# Patient Record
Sex: MALE | Race: WHITE
[De-identification: names, ages, dates, MRNs, and addresses within clinical notes are randomized per-mention and may not be internally consistent; named-entity substitution may affect disease eponyms.]

---

## 2018-09-16 ENCOUNTER — HOSPITAL ENCOUNTER (EMERGENCY)
Dept: HOSPITAL 80 - FED | Age: 18
LOS: 1 days | Discharge: HOME | End: 2018-09-17
Payer: COMMERCIAL
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
## 2018-09-16 DIAGNOSIS — S02.2XXA: ICD-10-CM

## 2018-09-16 DIAGNOSIS — S46.911A: ICD-10-CM

## 2018-09-16 DIAGNOSIS — S01.512A: Primary | ICD-10-CM
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
## 2018-09-16 DIAGNOSIS — S00.83XA: ICD-10-CM
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
## 2018-09-16 PROCEDURE — A4565 SLINGS: HCPCS

## 2018-09-16 PROCEDURE — 0CQ4XZZ REPAIR BUCCAL MUCOSA, EXTERNAL APPROACH: ICD-10-PCS

## 2018-09-17 VITALS — DIASTOLIC BLOOD PRESSURE: 107 MMHG | SYSTOLIC BLOOD PRESSURE: 134 MMHG

## 2018-09-17 NOTE — EDPHY
H & P


Source: Patient





- Personal History


Current Tetanus Diphtheria and Acellular Pertussis (TDAP): Yes





- Medical/Surgical History


Hx Asthma: No


Hx Chronic Respiratory Disease: No


Hx Diabetes: No


Hx Cardiac Disease: No


Hx Renal Disease: No


Hx Cirrhosis: No


Hx Alcoholism: No


Hx HIV/AIDS: No


Hx Splenectomy or Spleen Trauma: No


Other PMH: SX NOSE 2015





- Social History


Smoking Status: Never smoked


Time Seen by Provider: 09/17/18 00:02


HPI/ROS: 





HPI





CHIEF COMPLAINT:  Fall, head injury, facial trauma





HISTORY OF PRESENT ILLNESS:  17-year-old male, otherwise healthy, presents 

emergency room after states he did a back flip in his dorm room and did not 

make it all the way around slipped, fell on his face.  Striking his nose and 

right forehead.  Complains of a headache.  Mainly frontal.  Additionally 

complains of nasal bridge pain, additionally right shoulder pain.  Additionally 

he has an intraoral upper lip laceration.  Denies neck pain, denies chest pain 

or shortness of breath.





Past Medical History:  Denies significant medical history





Past Surgical History:  Denies significant surgical history





Social History:  Denies drugs alcohol tobacco.  [employer] ([place], [place]) student.


Family History:  Noncontributory








ROS   


REVIEW OF SYSTEMS:


10 Systems were reviewed and negative with the exception of the elements 

mentioned in the history of present illness.








Exam   


Constitutional appears well nontoxic no acute distress,  triage nursing summary 

reviewed, vital signs reviewed, awake/alert. 


Eyes   normal conjunctivae and sclera, EOMI, PERRLA. 


HENT face:  Nasal bridge is swollen, no septal hematoma on exam, midface stable

, right forehead shows a right forehead hematoma, abrasion over right eyebrow, 

intraorally there is a right upper lip laceration.  moist mucus membranes, no 

epistaxis, neck supple/ no meningismus, no raccoon eyes. 


Respiratory   clear to auscultation bilaterally, normal breath sounds, no 

respiratory distress, no wheezing. 


Cardiovascular   rate normal, regular rhythm, no murmur, no edema, distal 

pulses normal. 


Gastrointestinal   soft, non-tender, no rebound, no guarding, normal bowel 

sounds, no distension, no pulsatile mass. 


Genitourinary   no CVA tenderness. 


Musculoskeletal  no midline vertebral tenderness, full range of motion, no calf 

swelling, no tenderness of extremities, no meningismus, good pulses, 

neurovascularly intact.


Skin   pink, warm, & dry, no rash, skin atraumatic. 


Neurologic   awake, alert and oriented x 3, AAOx3, moves all 4 extremities 

equally, motor intact, sensory intact, CN II-XII intact, normal cerebellar, 

normal vision, normal speech. 


Psychiatric   normal mood/affect. 


Heme/Lymph/Immune   no lymphadenopathy.





Differential Diagnosis:  Includes but is not limited to in a particular order 

closed-head injury, intracranial bleed, skull fracture, facial contusion, 

intraoral lip laceration, nasal bone fracture, right shoulder injury





Medical Decision Making:  Plan for this patient CT scan head without contrast, 

CT maxillofacial without contrast for trauma, right shoulder x-ray.  

Additionally patient need his intraoral lip laceration repaired.  Dentition 

intact.





Re-evaluation:








CT scan head without contrast negative for acute traumatic injury or bleed.





CT maxillofacial shows a nasal bone fracture.


Called to me by Dr. Oppenheimer.





X-ray of the right shoulder reviewed by myself.  Negative for acute traumatic 

injury





Given the patient's nasal bone fracture will refer him to ENT.





Additionally due to the right anterior shoulder pain negative x-ray will refer 

him to Orthopedics.











Sling provided for shoulder strain.  Follow up with Orthopedics.


 (Sanju Umanzor)


Constitutional: 


 Initial Vital Signs











Temperature (C)  36.7 C   09/16/18 23:38


 


Heart Rate  95   09/16/18 23:38


 


Respiratory Rate  16   09/16/18 23:38


 


Blood Pressure  132/90 H  09/16/18 23:38


 


O2 Sat (%)  98   09/16/18 23:38








 











O2 Delivery Mode               Room Air














Allergies/Adverse Reactions: 


 





No Known Allergies Allergy (Unverified 09/16/18 23:42)


 








Home Medications: 














 Medication  Instructions  Recorded


 


MINOCYCLINE HCL  09/16/18














Medical Decision Making


Procedures: 





My involvement the care this patient is solely for procedure.  Please see the 

note of the attending physician for all other aspects of care. 








PROCEDURE: Laceration repair


Consent:  Verbal


Location:  Right labial mucosa


Length of repair:  2 cm


Complexity:  Simple


Layer involvement:  Single


Anesthesia:  Local.  0.5% Marcaine without epinephrine.  5 mL


Irrigation:  Extensive


Debridement:  None


Procedure description:  Following good anesthesia, the wound was copiously 

irrigated.  Wound bed was explored with a sterile glove, and there is no 

foreign body noted.  No muscular layer injury Wound borders were approximated 

well with good hemostasis.  Tolerated well without complication.


Suture/Staple material:  5-0 fast-absorbing gut.  Two simple interrupted sutures


Wound care:  Routine as discussed


Suture/Staple removal:  No removal necessary (Erik Quintero)





Departure





- Departure


Disposition: Home, Routine, Self-Care


Clinical Impression: 


Nasal bone fracture


Qualifiers:


 Encounter type: initial encounter Fracture type: closed Qualified Code(s): 

S02.2XXA - Fracture of nasal bones, initial encounter for closed fracture





Strain of shoulder, right


Qualifiers:


 Encounter type: initial encounter Qualified Code(s): S46.911A - Strain of 

unspecified muscle, fascia and tendon at shoulder and upper arm level, right arm

, initial encounter





Condition: Good


Instructions:  Care For Your Stitches (ED), Nasal Fracture (ED), Laceration (ED)

, Rotator Cuff Injury (ED)


Referrals: 


NONE *PRIMARY CARE P,. [Primary Care Provider] - As per Instructions


Norma Vega MD [Medical Doctor] - As per Instructions


César Park MD [Medical Doctor] - As per Instructions